# Patient Record
Sex: MALE | Race: WHITE | NOT HISPANIC OR LATINO | URBAN - METROPOLITAN AREA
[De-identification: names, ages, dates, MRNs, and addresses within clinical notes are randomized per-mention and may not be internally consistent; named-entity substitution may affect disease eponyms.]

---

## 2024-08-21 NOTE — PROGRESS NOTES
Subjective   Patient ID: Mikhail Herndon is a 63 y.o. male who presents for Follow-up.  HPI  This 63-year-old male last seen in the office in August of last year is being seen in follow-up.  His major difficulties in the past had been centering on barotrauma which was a problem since he was frequently flying because of work.  He has been involved with functional medicine in regards to health checks.  He is generally had no problems in regards to tinnitus or vertigo and has been no infectious difficulties requiring treatment.  Seasonal allergies at times do increased congestion and drainage often helped by Zyrtec and Flonase use.  He does not times have some ceruminous buildup requiring debridement.  He has had no difficulties with positional vertigo this past year.  Very rarely does he have some faint tinnitus in quiet areas.  Review of Systems  A 12 point ROS has been reviewed and are negative for complaint except what is stated in the history of present illness and/or past medical history as noted in the EMR    Active Ambulatory Problems     Diagnosis Date Noted    Benign paroxysmal positional vertigo 08/26/2024    Bilateral impacted cerumen 08/26/2024    Chronic rhinitis 08/26/2024    Excessive cerumen in ear canal, left 08/26/2024    Post-nasal drainage 08/26/2024    Pressure in head 08/26/2024     Resolved Ambulatory Problems     Diagnosis Date Noted    No Resolved Ambulatory Problems     Past Medical History:   Diagnosis Date    Other allergy status, other than to drugs and biological substances          Current Outpatient Medications:     cetirizine (ZyrTEC) 10 mg capsule, Take 1 capsule (10 mg) by mouth once daily., Disp: , Rfl:     fluticasone (Flonase Allergy Relief) 50 mcg/actuation nasal spray, Administer 1 spray into each nostril once daily., Disp: , Rfl:     phenylephrine-acetaminophen 5-325 mg tablet, Take by mouth., Disp: , Rfl:      Social History     Tobacco Use    Smoking status: Never    Smokeless  "tobacco: Never   Substance Use Topics    Alcohol use: Yes       Past Surgical History:   Procedure Laterality Date    BACK SURGERY  10/23/2015    Back Surgery    HERNIA REPAIR  10/23/2015    Hernia Repair    NASAL SEPTUM SURGERY  11/22/2017    Nasal Septal Deviation Repair    NOSE SURGERY  11/22/2017    Nose Surgery        Allergies   Allergen Reactions    House Dust Unknown       Height 1.727 m (5' 8\"), weight 61.2 kg (135 lb).    Objective   Physical Exam  EXAMINATION:    GENERAL GARFIELD.EARANCE: Alert, in no acute distress, normal pitch and clarity of voice, well-developed and nourished, cooperative.    HEAD/FACE: Normocephalic, atraumatic, normal facial movements and strength, no no tenderness to palpation, no lesions noted.    SKIN: Normal turgor, no raised or ulcerative lesions, warm and dry to palpation.    EYES: Extraocular motions intact, no nystagmus noted, pupils equal and reactive to light and accommodation, no conjunctivitis.    EARS: Both ears--external ear anatomy is normal without lesions, auditory canals are patent and without skin abrasions or lesions, hearing is intact to voice, partially obstructive cerumen is noted on the left and is removed, tympanic membranes are intact with no acute inflammation, light reflexes present, no effusions are noted and no mastoid tenderness found to palpation.    NOSE: No external skin lesions are noted, nares are patent, septum is intact, sinuses are nontender to palpation bilaterally, no intranasal lesions or inflammation is noted, nasal valve is normal.    OROPHARYNX/ORAL CAVITY: Oropharynx is not inflamed and is without lesions, mucosa of the oral cavity is intact and without lesions, tongue is midline and mobile, no acute dental disease is noted, TMJs are mobile    LUNG-- NO wheezing or rhonchi normal respiratory effort    HEART-- No venous congestion,  rate and rhythm regular,    NECK: No lymphadenopathy is palpated, carotid pulses are intact, neck is supple with " full range of motion, no thyroid abnormalities are noted, trachea is midline, no neck masses are palpated.    LYMPHATICS: No cervical adenopathy or supraclavicular adenopathy is palpated.    NEUROLOGIC/PSYCH; alert and oriented, cranial nerves are grossly intact, gait is without falling, no motor deficits are noted.    Patient ID: Mikhail Herndon is a 63 y.o. male.    Ear cerumen removal    Date/Time: 8/26/2024 10:55 AM    Performed by: Ector Borrero DMD, MD  Authorized by: Ector Borrero DMD, MD    Consent:     Consent obtained:  Verbal    Consent given by:  Patient    Risks discussed:  Pain and incomplete removal    Alternatives discussed:  No treatment and alternative treatment  Universal protocol:     Procedure explained and questions answered to patient or proxy's satisfaction: yes      Imaging studies available: no      Required blood products, implants, devices, and special equipment available: no      Patient identity confirmed:  Verbally with patient  Procedure details:     Location:  L ear and R ear    Procedure type: curette      Procedure type comment:  Or suction    Procedure outcomes: cerumen removed    Post-procedure details:     Inspection:  No bleeding and ear canal clear    Hearing quality:  Improved    Procedure completion:  Tolerated well, no immediate complications  Comments:      Examination of both ears revealed a clear right ear canal with a normal tympanic membrane.  On the left-hand side there is partial ceruminous buildup which was removed with a wax loop.  The tympanic membrane is intact.    Assessment/Plan   Problem List Items Addressed This Visit             ICD-10-CM    Bilateral impacted cerumen - Primary H61.23    Chronic rhinitis J31.0     Other Visit Diagnoses         Codes    Impacted cerumen of left ear     H61.22          I discussed the clinical finds with the patient.  I encouraged him to continue with the saline nasal rinses frequently during the week to help counteract  nasal discharge issues.  I also suggested he use a topical saline gel spray mist during the day to help counteract dryness.  He has had some mild bleeding at times and this might help to indicate that.  If he has any particular problems during the year from a respiratory standpoint he needs to contact the office.  We did talk about hearing since he has not had any testing done due to his lack of subjective symptoms.  I did encourage him to consider having a test done at one of his visits especially if he starts to sense more background tinnitus.  He should stay well-hydrated, avoid excessive salt in the diet as well.  No other active head and neck related issues are noted at this time and he will be seen again in 1 year    This patient is advised to follow up with their PCP for all other health care issues and treatment. Dictation was done with dragon transcription and errors in spelling  and diction are possible.       Ector Borrero DMD, MD 08/26/24 10:56 AM

## 2024-08-26 ENCOUNTER — APPOINTMENT (OUTPATIENT)
Dept: OTOLARYNGOLOGY | Facility: CLINIC | Age: 64
End: 2024-08-26
Payer: COMMERCIAL

## 2024-08-26 VITALS — WEIGHT: 135 LBS | HEIGHT: 68 IN | BODY MASS INDEX: 20.46 KG/M2

## 2024-08-26 DIAGNOSIS — J31.0 CHRONIC RHINITIS: Primary | ICD-10-CM

## 2024-08-26 DIAGNOSIS — H61.893 EAR CANAL DRYNESS, BILATERAL: ICD-10-CM

## 2024-08-26 DIAGNOSIS — H61.22 IMPACTED CERUMEN OF LEFT EAR: ICD-10-CM

## 2024-08-26 PROBLEM — H81.10 BENIGN PAROXYSMAL POSITIONAL VERTIGO: Status: ACTIVE | Noted: 2024-08-26

## 2024-08-26 PROBLEM — R51.9 PRESSURE IN HEAD: Status: ACTIVE | Noted: 2024-08-26

## 2024-08-26 PROBLEM — H61.23 BILATERAL IMPACTED CERUMEN: Status: ACTIVE | Noted: 2024-08-26

## 2024-08-26 PROBLEM — R09.82 POST-NASAL DRAINAGE: Status: ACTIVE | Noted: 2024-08-26

## 2024-08-26 PROCEDURE — 3008F BODY MASS INDEX DOCD: CPT | Performed by: OTOLARYNGOLOGY

## 2024-08-26 PROCEDURE — 69210 REMOVE IMPACTED EAR WAX UNI: CPT | Performed by: OTOLARYNGOLOGY

## 2024-08-26 PROCEDURE — 99213 OFFICE O/P EST LOW 20 MIN: CPT | Performed by: OTOLARYNGOLOGY

## 2024-08-26 PROCEDURE — 1036F TOBACCO NON-USER: CPT | Performed by: OTOLARYNGOLOGY

## 2024-08-26 RX ORDER — FLUTICASONE PROPIONATE 50 MCG
1 SPRAY, SUSPENSION (ML) NASAL DAILY
COMMUNITY
Start: 2015-10-23

## 2025-08-26 ENCOUNTER — APPOINTMENT (OUTPATIENT)
Dept: OTOLARYNGOLOGY | Facility: CLINIC | Age: 65
End: 2025-08-26
Payer: COMMERCIAL

## 2025-08-26 DIAGNOSIS — H61.893 EAR CANAL DRYNESS, BILATERAL: ICD-10-CM

## 2025-08-26 DIAGNOSIS — R09.82 POST-NASAL DRAINAGE: ICD-10-CM

## 2025-08-26 DIAGNOSIS — J31.0 CHRONIC RHINITIS: Primary | ICD-10-CM

## 2025-08-26 DIAGNOSIS — H61.22 CERUMEN DEBRIS ON TYMPANIC MEMBRANE OF LEFT EAR: ICD-10-CM

## 2025-08-26 PROCEDURE — 99213 OFFICE O/P EST LOW 20 MIN: CPT | Performed by: OTOLARYNGOLOGY

## 2025-08-26 PROCEDURE — 69210 REMOVE IMPACTED EAR WAX UNI: CPT | Performed by: OTOLARYNGOLOGY

## 2025-08-26 PROCEDURE — 1036F TOBACCO NON-USER: CPT | Performed by: OTOLARYNGOLOGY

## 2025-08-26 RX ORDER — ROSUVASTATIN CALCIUM 5 MG/1
1 TABLET, COATED ORAL
COMMUNITY
Start: 2025-08-08

## 2026-08-27 ENCOUNTER — APPOINTMENT (OUTPATIENT)
Dept: OTOLARYNGOLOGY | Facility: CLINIC | Age: 66
End: 2026-08-27
Payer: COMMERCIAL